# Patient Record
Sex: MALE | Race: WHITE | Employment: FULL TIME
[De-identification: names, ages, dates, MRNs, and addresses within clinical notes are randomized per-mention and may not be internally consistent; named-entity substitution may affect disease eponyms.]

---

## 2019-09-30 ENCOUNTER — HEALTH MAINTENANCE LETTER (OUTPATIENT)
Age: 49
End: 2019-09-30

## 2019-11-18 ENCOUNTER — OFFICE VISIT (OUTPATIENT)
Dept: URGENT CARE | Facility: URGENT CARE | Age: 49
End: 2019-11-18
Payer: COMMERCIAL

## 2019-11-18 VITALS
DIASTOLIC BLOOD PRESSURE: 78 MMHG | WEIGHT: 200 LBS | HEART RATE: 79 BPM | BODY MASS INDEX: 28.63 KG/M2 | SYSTOLIC BLOOD PRESSURE: 148 MMHG | TEMPERATURE: 97.9 F | HEIGHT: 70 IN

## 2019-11-18 DIAGNOSIS — S61.011A LACERATION OF RIGHT THUMB WITHOUT FOREIGN BODY WITHOUT DAMAGE TO NAIL, INITIAL ENCOUNTER: Primary | ICD-10-CM

## 2019-11-18 PROCEDURE — 12001 RPR S/N/AX/GEN/TRNK 2.5CM/<: CPT | Performed by: FAMILY MEDICINE

## 2019-11-18 ASSESSMENT — MIFFLIN-ST. JEOR: SCORE: 1768.44

## 2019-11-19 ASSESSMENT — ENCOUNTER SYMPTOMS
WOUND: 1
BRUISES/BLEEDS EASILY: 0
FEVER: 0

## 2019-11-19 NOTE — PROGRESS NOTES
SUBJECTIVE:   Sean Pérez is a 49 year old male presenting with a chief complaint of   Chief Complaint   Patient presents with     Laceration     cut right thumb on knife tonight       Cut his thumb with a kitchen knife just a few hours ago and minimal bleeding. Linear wound approx 2 cm in length on the distal palmar surface of thumb     Review of Systems   Constitutional: Negative for fever.   Skin: Positive for wound. Negative for rash.   Allergic/Immunologic: Negative for immunocompromised state.   Hematological: Does not bruise/bleed easily.       Past Medical History:   Diagnosis Date     Diabetes mellitus (H)      History reviewed. No pertinent family history.  Current Outpatient Medications   Medication Sig Dispense Refill     ACCU-CHEK MULTICLIX LANCETS MISC Use to test blood sugar 6 times daily or as directed. 204 each prn     aspirin 81 MG tablet Take 1 tablet by mouth daily.       levothyroxine (SYNTHROID,LEVOTHROID) 100 MCG tablet Take 1 tablet (100 mcg) by mouth daily 90 tablet 0     NOVOLOG FLEXPEN SOLN 100 UNIT/ML Inject 14 Units Subcutaneous 3 times daily (before meals) 3 Month 4     Nutritional Supplements (GLUCOSE MANAGEMENT) TABS Take 1 tablet by mouth as needed. 180 tablet 11     simvastatin (ZOCOR) 20 MG tablet Take 20 mg by mouth At Bedtime.       glucagon (GLUCAGON EMERGENCY) 1 MG injection For use in hypoglycemic emergency 1 mg 0     insulin glargine (LANTUS SOLOSTAR) 100 UNIT/ML soln Inject  Subcutaneous 16 units in the am, 28-29 units in the pm 3 Month 4     NOVOLOG FLEXPEN 100 UNIT/ML soln 14 units before breakfast, 14 units before lunch, 20 units before dinner; dispense #5 vials for 90-day supply 3 Month 3     Social History     Tobacco Use     Smoking status: Former Smoker     Packs/day: 0.50     Years: 20.00     Pack years: 10.00     Types: Cigarettes     Smokeless tobacco: Former User     Tobacco comment: a pack a week   Substance Use Topics     Alcohol use: Yes     Comment: rare 12  "pack a week       OBJECTIVE  BP (!) 148/78   Pulse 79   Temp 97.9  F (36.6  C) (Oral)   Ht 1.778 m (5' 10\")   Wt 90.7 kg (200 lb)   BMI 28.70 kg/m      Physical Exam  Neck:      Musculoskeletal: Normal range of motion.   Cardiovascular:      Rate and Rhythm: Normal rate.      Pulses: Normal pulses.   Musculoskeletal: Normal range of motion.   Skin:     General: Skin is warm.      Capillary Refill: Capillary refill takes less than 2 seconds.      Comments: Noted distal linear lac on distal thumb overlying finger tuft of thumb   Approximately 2 cm in length and not gaping. Mostly superficial layers    Neurological:      General: No focal deficit present.      Mental Status: He is alert and oriented to person, place, and time.   Psychiatric:         Mood and Affect: Mood normal.       ASSESSMENT:    ICD-10-CM    1. Laceration of right thumb without foreign body without damage to nail, initial encounter S61.011A         PLAN:  Would cares explained  Glue or adhesive closure without great results.   The patient indicates understanding of these issues and agrees with the plan.   Patient educational/instructional material provided including reasons for follow-up   Claudy Jamil MD        "

## 2020-11-14 ENCOUNTER — VIRTUAL VISIT (OUTPATIENT)
Dept: URGENT CARE | Facility: CLINIC | Age: 50
End: 2020-11-14
Payer: COMMERCIAL

## 2020-11-14 DIAGNOSIS — M79.10 MYALGIA: Primary | ICD-10-CM

## 2020-11-14 PROCEDURE — 99213 OFFICE O/P EST LOW 20 MIN: CPT | Mod: 95 | Performed by: PHYSICIAN ASSISTANT

## 2020-11-14 ASSESSMENT — ENCOUNTER SYMPTOMS
EYES NEGATIVE: 1
NEUROLOGICAL NEGATIVE: 1
GASTROINTESTINAL NEGATIVE: 1
PSYCHIATRIC NEGATIVE: 1
RESPIRATORY NEGATIVE: 1
CONSTITUTIONAL NEGATIVE: 1
MYALGIAS: 1

## 2020-11-14 NOTE — PROGRESS NOTES
"The patient has been notified of the following:      \"We have found that certain health care needs can be provided without the need for a face to face visit.  This service lets us provide the care you need with a phone conversation.       I will have full access to your [facility] medical record during this entire phone call.   I will be taking notes for your medical record.      Since this is like an office visit, we will bill your insurance company for this service.       There are potential benefits and risks of telephone visits (e.g. limits to patient confidentiality) that differ from in-person visits.?  Confidentiality still applies for telephone services, and nobody will record the visit.  It is important to be in a quiet, private space that is free of distractions (including cell phone or other devices) during the visit.??      If during the course of the call I believe a telephone visit is not appropriate, you will not be charged for this service\"     Consent has been obtained for this service by care team member: Yes       SUBJECTIVE:   Sean Pérez is a 50 year old male presenting with a chief complaint of Covid-19 exposure.     He is an established patient of [facility].    Son tested positive on 11/11/20. Patient has had direct contact with son. Patient's only symptoms are intermittent myalgias. Patient wanted to have himself, wife, and daughter tested.    Review of Systems   Constitutional: Negative.    HENT: Negative.    Eyes: Negative.    Respiratory: Negative.    Gastrointestinal: Negative.    Genitourinary: Negative.    Musculoskeletal: Positive for myalgias.   Neurological: Negative.    Psychiatric/Behavioral: Negative.        Past Medical History:   Diagnosis Date     Diabetes mellitus (H)      No family history on file.  Current Outpatient Medications   Medication Sig Dispense Refill     ACCU-CHEK MULTICLIX LANCETS MISC Use to test blood sugar 6 times daily or as directed. 204 each prn     aspirin 81 " "MG tablet Take 1 tablet by mouth daily.       glucagon (GLUCAGON EMERGENCY) 1 MG injection For use in hypoglycemic emergency 1 mg 0     insulin glargine (LANTUS SOLOSTAR) 100 UNIT/ML soln Inject  Subcutaneous 16 units in the am, 28-29 units in the pm 3 Month 4     levothyroxine (SYNTHROID,LEVOTHROID) 100 MCG tablet Take 1 tablet (100 mcg) by mouth daily 90 tablet 0     NOVOLOG FLEXPEN SOLN 100 UNIT/ML Inject 14 Units Subcutaneous 3 times daily (before meals) 3 Month 4     Nutritional Supplements (GLUCOSE MANAGEMENT) TABS Take 1 tablet by mouth as needed. 180 tablet 11     simvastatin (ZOCOR) 20 MG tablet Take 20 mg by mouth At Bedtime.       Social History     Tobacco Use     Smoking status: Former Smoker     Packs/day: 0.50     Years: 20.00     Pack years: 10.00     Types: Cigarettes     Smokeless tobacco: Former User     Tobacco comment: a pack a week   Substance Use Topics     Alcohol use: Yes     Comment: rare 12 pack a week       OBJECTIVE  Vital Signs and PE were not done as this was a telephone visit.       ASSESSMENT/PLAN:    Our Telephone visit was 15 min in duration.     (M79.10) Myalgia  (primary encounter diagnosis)  Plan: Symptomatic COVID-19 Virus (Coronavirus) by PCR    Discharge Instructions for COVID-19 Patients  You have--or may have--COVID-19. Please follow the instructions listed below.   If you have a weakened immune system, discuss with your doctor any other actions you need to take.  How can I protect others?  If you have symptoms (fever, cough, body aches or trouble breathing):    Stay home and away from others (self-isolate) until:  ? At least 10 days have passed since your symptoms started, And   ? You've had no fever--and no medicine that reduces fever--for 1 full day (24 hours), And    ? Your other symptoms have resolved (gotten better).  If you don't show symptoms, but testing showed that you have COVID-19:    Stay home and away from others (self-isolate). Follow the tips under \"How do I " "self-isolate?\" below for 10 days (20 days if you have a weak immune system).    You don't need to be retested for COVID-19 before going back to school or work. As long as you're fever-free and feeling better, you can go back to school, work and other activities after waiting the 10 or 20 days.   How do I self-isolate?    Stay in your own room, even for meals. Use your own bathroom if you can.    Stay away from others in your home. No hugging, kissing or shaking hands. No visitors.    Don't go to work, school or anywhere else.    Clean \"high touch\" surfaces often (doorknobs, counters, handles). Use household cleaning spray or wipes. You'll find a full list of  on the EPA website: www.epa.gov/pesticide-registration/list-n-disinfectants-use-against-sars-cov-2.    Cover your mouth and nose with a mask or other face covering to avoid spreading germs.    Wash your hands and face often. Use soap and water.    Caregivers in these groups are at risk for severe illness due to COVID-19:  ? People 65 years and older  ? People who live in a nursing home or long-term care facility  ? People with chronic disease (lung, heart, cancer, diabetes, kidney, liver, immunologic)  ? People who have a weakened immune system, including those who:    Are in cancer treatment    Take medicine that weakens the immune system, such as corticosteroids    Had a bone marrow or organ transplant    Have an immune deficiency    Have poorly controlled HIV or AIDS    Are obese (body mass index of 40 or higher)    Smoke regularly    Caregivers should wear gloves while washing dishes, handling laundry and cleaning bedrooms and bathrooms.    Use caution when washing and drying laundry: Don't shake dirty laundry and use the warmest water setting that you can.    For more tips on managing your health at home, go to www.cdc.gov/coronavirus/2019-ncov/downloads/10Things.pdf.  How can I take care of myself at home?  1. Get lots of rest. Drink extra fluids " (unless a doctor has told you not to).    2. Take Tylenol (acetaminophen) for fever or pain. If you have liver or kidney problems, ask your family doctor if it's okay to take Tylenol.     Adults can take either:  ? 650 mg (two 325 mg pills) every 4 to 6 hours, or   ? 1,000 mg (two 500 mg pills) every 8 hours as needed.  ? Note: Don't take more than 3,000 mg in one day. Acetaminophen is found in many medicines (both prescribed and over-the-counter medicines). Read all labels to be sure you don't take too much.   For children, check the Tylenol bottle for the right dose. The dose is based on the child's age or weight.  3. If you have other health problems (like cancer, heart failure, an organ transplant or severe kidney disease): Call your specialty clinic if you don't feel better in the next 2 days.    4. Know when to call 911. Emergency warning signs include:  ? Trouble breathing or shortness of breath  ? Pain or pressure in the chest that doesn't go away  ? Feeling confused like you haven't felt before, or not being able to wake up  ? Bluish-colored lips or face    5. Your doctor may have prescribed a blood thinner medicine. Follow their instructions.  Where can I get more information?    [facility] - About COVID-19: [email or web address]    CDC - What to Do If You're Sick: www.cdc.gov/coronavirus/2019-ncov/about/steps-when-sick.html    CDC - Ending Home Isolation: www.cdc.gov/coronavirus/2019-ncov/hcp/disposition-in-home-patients.html    CDC - Caring for Someone: www.cdc.gov/coronavirus/2019-ncov/if-you-are-sick/care-for-someone.html    [facility] - Interim Guidance for Hospital Discharge to Home: www.health.[facility].[place]/diseases/coronavirus/hcp/hospdischarge.pdf    [facility] clinical trials (COVID-19 research studies): [email or web address]/umn-clinical-trials    Below are the COVID-19 hotlines at the [place] Department of Health ([facility]). Interpreters are available.  ? For health questions:  Call 517-685-3089 or 1-177.963.9068 (7 a.m. to 7 p.m.)  ? For questions about schools and childcare: Call 411-212-7567 or 1-755.125.7407 (7 a.m. to 7 p.m.)    For informational purposes only. Not to replace the advice of your health care provider. Clinically reviewed by the Infection Prevention Team. Copyright   2020 [facility]. All rights reserved. [employer] 777638 - REV 08/04/20.    Ken Townsend PA-C on 11/14/2020 at 9:41 AM

## 2021-01-15 ENCOUNTER — HEALTH MAINTENANCE LETTER (OUTPATIENT)
Age: 51
End: 2021-01-15

## 2021-08-29 ENCOUNTER — HEALTH MAINTENANCE LETTER (OUTPATIENT)
Age: 51
End: 2021-08-29

## 2021-10-24 ENCOUNTER — HEALTH MAINTENANCE LETTER (OUTPATIENT)
Age: 51
End: 2021-10-24

## 2022-02-13 ENCOUNTER — HEALTH MAINTENANCE LETTER (OUTPATIENT)
Age: 52
End: 2022-02-13

## 2022-04-10 ENCOUNTER — HEALTH MAINTENANCE LETTER (OUTPATIENT)
Age: 52
End: 2022-04-10

## 2022-10-15 ENCOUNTER — HEALTH MAINTENANCE LETTER (OUTPATIENT)
Age: 52
End: 2022-10-15

## 2023-03-26 ENCOUNTER — HEALTH MAINTENANCE LETTER (OUTPATIENT)
Age: 53
End: 2023-03-26

## 2023-06-01 ENCOUNTER — HEALTH MAINTENANCE LETTER (OUTPATIENT)
Age: 53
End: 2023-06-01

## 2024-01-07 ENCOUNTER — HEALTH MAINTENANCE LETTER (OUTPATIENT)
Age: 54
End: 2024-01-07